# Patient Record
Sex: MALE | Race: WHITE
[De-identification: names, ages, dates, MRNs, and addresses within clinical notes are randomized per-mention and may not be internally consistent; named-entity substitution may affect disease eponyms.]

---

## 2017-07-21 ENCOUNTER — HOSPITAL ENCOUNTER (EMERGENCY)
Dept: HOSPITAL 25 - UCCORT | Age: 47
Discharge: HOME | End: 2017-07-21
Payer: COMMERCIAL

## 2017-07-21 VITALS — SYSTOLIC BLOOD PRESSURE: 136 MMHG | DIASTOLIC BLOOD PRESSURE: 89 MMHG

## 2017-07-21 DIAGNOSIS — S01.01XA: Primary | ICD-10-CM

## 2017-07-21 DIAGNOSIS — Z23: ICD-10-CM
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
## 2017-07-21 PROCEDURE — 99212 OFFICE O/P EST SF 10 MIN: CPT

## 2017-07-21 PROCEDURE — G0463 HOSPITAL OUTPT CLINIC VISIT: HCPCS

## 2017-07-21 PROCEDURE — 12013 RPR F/E/E/N/L/M 2.6-5.0 CM: CPT

## 2017-07-21 PROCEDURE — 90715 TDAP VACCINE 7 YRS/> IM: CPT

## 2017-07-21 PROCEDURE — 90471 IMMUNIZATION ADMIN: CPT

## 2017-07-21 PROCEDURE — 12002 RPR S/N/AX/GEN/TRNK2.6-7.5CM: CPT

## 2017-07-21 NOTE — UC
Laceration HPI





- HPI Summary


HPI Summary: 





47 year old male presents with frontal head laceration. 





- History Of Current Complaint


Chief Complaint: UCLaceration


Stated Complaint: FOREHEAD LAC WC


Time Seen by Provider: 07/21/17 11:07


Pain Intensity: 1


Pain Scale Used: 0-10 Numeric





- Allergies/Home Medications


Allergies/Adverse Reactions: 


 Allergies











Allergy/AdvReac Type Severity Reaction Status Date / Time


 


No Known Allergies Allergy   Verified 07/21/17 11:05














PMH/Surg Hx/FS Hx/Imm Hx





- Surgical History


Surgical History: Yes


Surgery Procedure, Year, and Place: Left index finger with graft, testoscopy 

and release s/p injury at age 5 yrs





- Family History


Known Family History: 


   Negative: Diabetes





- Social History


Alcohol Use: None


Substance Use Type: None


Smoking Status (MU): Never Smoked Tobacco





- Immunization History


Most Recent Tetanus Shot: unknown





Review of Systems


Constitutional: Negative


Skin: Other - fontal scalp laceration


Eyes: Negative


ENT: Negative


Respiratory: Negative


Cardiovascular: Negative


Gastrointestinal: Negative


Genitourinary: Negative


Motor: Negative


Neurovascular: Negative


Musculoskeletal: Negative


Neurological: Negative


Psychological: Negative


All Other Systems Reviewed And Are Negative: Yes





Physical Exam


Triage Information Reviewed: Yes


Vital Signs: 


 Initial Vital Signs











Temp  37.3 C   07/21/17 10:59


 


Pulse  70   07/21/17 10:59


 


Resp  16   07/21/17 10:59


 


BP  136/89   07/21/17 10:59


 


Pulse Ox  98   07/21/17 10:59











Eye Exam: Normal


ENT Exam: Normal


Dental Exam: Normal


Neck exam: Normal


Neck: Positive: 1


Respiratory Exam: Normal


Cardiovascular Exam: Normal


Abdominal Exam: Normal


Musculoskeletal Exam: Normal


Neurological Exam: Normal


Psychological Exam: Normal


Skin Exam: Normal


Skin: Positive: Other - frontal scalp laceration





Laceration Repair





- Laceration Repair


  ** 1


Laceration Size After Repair: Length (cm) - 3


Modified For Repair: No


Cleansing Completed Via Routine Prep: Yes


Irrigation With Pressure Irrigation Device: Yes


Closure Material: Staples - 4 staples to frontal scalp


Suture Of: Skin





Laceration Course/Dx





- Differential Dx - Laceration/Wound


Provider Diagnoses: scalp laceration





Discharge





- Discharge Plan


Condition: Stable


Disposition: HOME


Prescriptions: 


Sulfamethox/Trimethoprim DS* [Bactrim /160 TAB*] 1 tab PO BID #20 tab


Patient Education Materials:  Laceration (ED)


Referrals: 


Niki Doshi MD [Medical Doctor] - 2 Days

## 2017-07-24 ENCOUNTER — HOSPITAL ENCOUNTER (EMERGENCY)
Dept: HOSPITAL 25 - UCCORT | Age: 47
Discharge: HOME | End: 2017-07-24
Payer: COMMERCIAL

## 2017-07-24 VITALS — SYSTOLIC BLOOD PRESSURE: 131 MMHG | DIASTOLIC BLOOD PRESSURE: 86 MMHG

## 2017-07-24 DIAGNOSIS — Z09: Primary | ICD-10-CM

## 2017-07-24 PROCEDURE — 99211 OFF/OP EST MAY X REQ PHY/QHP: CPT

## 2017-07-24 PROCEDURE — G0463 HOSPITAL OUTPT CLINIC VISIT: HCPCS

## 2017-07-24 NOTE — UC
HPI Wound/Suture Re-check





- HPI Summary


HPI Summary: 





4 staples place in top of head 2 days ago--tetanus updated at that time





- History Of Current Complaint


Chief Complaint: [place]


Stated Complaint: RECHECK STAPLES-WC


Time Seen by Provider: 07/24/17 12:20


Hx Obtained From: Patient


Onset/Duration: Sudden Onset, Lasting Days - 2


Surgical Site: 





head


Severity: Mild


Pain Intensity: 0


Pain Scale Used: 0-10 Numeric





- Allergies/Home Medications


Allergies/Adverse Reactions: 


 Allergies











Allergy/AdvReac Type Severity Reaction Status Date / Time


 


No Known Allergies Allergy   Verified 07/24/17 12:26














PMH/Surg Hx/FS Hx/Imm Hx


Previously Healthy: Yes





- Surgical History


Surgical History: Yes


Surgery Procedure, Year, and Place: Left index finger with graft, testoscopy 

and release s/p injury at age 5 yrs





- Family History


Known Family History: 


   Negative: Diabetes





- Social History


Occupation: Employed Full-time


Lives: With Family


Alcohol Use: None


Substance Use Type: None


Smoking Status (MU): Never Smoked Tobacco





- Immunization History


Most Recent Tetanus Shot: unknown





Review of Systems


Constitutional: Negative


Skin: Other - intact staples on top of head---no c/o


Eyes: Negative


ENT: Negative


Respiratory: Negative


Cardiovascular: Negative


Gastrointestinal: Negative


Genitourinary: Negative


Motor: Negative


Neurovascular: Negative


Musculoskeletal: Negative


Neurological: Negative


Psychological: Negative


All Other Systems Reviewed And Are Negative: Yes





Physical Exam


Triage Information Reviewed: Yes


Appearance: Well-Appearing, No Pain Distress, Well-Nourished


Vital Signs Reviewed: Yes


Eye Exam: Normal


Eyes: Positive: Conjunctiva Clear


ENT Exam: Normal


ENT: Positive: Normal ENT inspection, Hearing grossly normal.  Negative: Nasal 

congestion, Nasal drainage, Trismus, Muffled/hoarse voice


Dental Exam: Normal


Neck exam: Normal


Neck: Positive: Supple, Nontender


Respiratory Exam: Normal


Respiratory: Positive: Chest non-tender, No respiratory distress, No accessory 

muscle use


Cardiovascular Exam: Normal


Cardiovascular: Positive: RRR, Pulses Normal, Brisk Capillary Refill


Musculoskeletal Exam: Normal


Musculoskeletal: Positive: Strength Intact, ROM Intact, No Edema


Neurological Exam: Normal


Neurological: Positive: Alert, Muscle Tone Normal


Psychological Exam: Normal


Skin: Positive: Other - 4 intact staples in healing wound no evidence of 

infection





Course/Dx





- Course


Course Of Treatment: continue a/b as rx, return in -9 days for staple removal





- Differential Dx - Laceration/Wound


Differential Diagnoses: Cellulitis, Healing Wound


Provider Diagnoses: Wound re-check, healing wound





Discharge





- Discharge Plan


Condition: Stable


Disposition: HOME


Patient Education Materials:  Staple Care (ED)


Referrals: 


[facility] [place] [facility] [Outside] - If Needed


Additional Instructions: 


return in 8-9 days for staple removal

## 2017-08-01 ENCOUNTER — HOSPITAL ENCOUNTER (EMERGENCY)
Dept: HOSPITAL 25 - UCCORT | Age: 47
Discharge: HOME | End: 2017-08-01
Payer: COMMERCIAL

## 2017-08-01 VITALS — DIASTOLIC BLOOD PRESSURE: 77 MMHG | SYSTOLIC BLOOD PRESSURE: 128 MMHG
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
## 2017-08-01 DIAGNOSIS — S01.01XD: Primary | ICD-10-CM

## 2017-08-01 PROCEDURE — G0463 HOSPITAL OUTPT CLINIC VISIT: HCPCS

## 2017-08-01 PROCEDURE — 99211 OFF/OP EST MAY X REQ PHY/QHP: CPT

## 2017-08-01 NOTE — UC
HPI Wound/Suture Re-check





- HPI Summary


HPI Summary: 





here for staple removal 


4 staple were place on the frontal scalp 9 days ago


the wound is healing well, no pain, swelling or discharge





- History Of Current Complaint


Stated Complaint: STAPLE REMOVAL


Time Seen by Provider: 08/01/17 12:39


Hx Obtained From: Patient


Onset/Duration: Sudden Onset, Lasting Days - 9, Still Present


Severity: Moderate


Procedure Type: staple removal 


Surgery Date: 07/21/17


Head: 


  __________________________














  __________________________





 1 - 1.5 cm laceration 4 staple is inplaced








- Allergies/Home Medications


Allergies/Adverse Reactions: 


 Allergies











Allergy/AdvReac Type Severity Reaction Status Date / Time


 


No Known Allergies Allergy   Verified 07/24/17 12:26














PMH/Surg Hx/FS Hx/Imm Hx


Previously Healthy: Yes





- Surgical History


Surgical History: Yes


Surgery Procedure, Year, and Place: Left index finger with graft, testoscopy 

and release s/p injury at age 5 yrs





- Family History


Known Family History: 


   Negative: Diabetes





- Social History


Alcohol Use: None


Substance Use Type: None


Smoking Status (MU): Never Smoked Tobacco





- Immunization History


Most Recent Tetanus Shot: July 2017





Review of Systems


Constitutional: Negative


Skin: Negative


Eyes: Negative


ENT: Negative


Respiratory: Negative


All Other Systems Reviewed And Are Negative: Yes





Physical Exam


Triage Information Reviewed: Yes


Appearance: Well-Appearing, No Pain Distress, Well-Nourished


Vital Signs: 


 Initial Vital Signs











Temp  99.1 F   08/01/17 12:43


 


Pulse  77   08/01/17 12:43


 


Resp  20   08/01/17 12:43


 


BP  128/77   08/01/17 12:43











Vital Signs Reviewed: Yes


Eye Exam: Normal


Eyes: Positive: Conjunctiva Clear


ENT: Positive: Normal ENT inspection, Hearing grossly normal, Pharynx normal


Neck: Positive: Supple, Nontender, No Lymphadenopathy


Respiratory: Positive: Chest non-tender, Lungs clear, Normal breath sounds


Cardiovascular: Positive: RRR, No Murmur, Pulses Normal


Musculoskeletal Exam: Normal


Skin: Positive: Other - 4 staple was removed from the frontal scalp, wound is 

clean , dry healing well





Course/Dx





- Differential Dx - Laceration/Wound


Provider Diagnoses: staple removal





Discharge





- Discharge Plan


Condition: Stable


Disposition: HOME


Patient Education Materials:  Acute Wound Care (ED)


Referrals: 


No Primary Care Phys,NOPCP [Primary Care Provider] - If Needed

## 2017-09-19 ENCOUNTER — HOSPITAL ENCOUNTER (EMERGENCY)
Dept: HOSPITAL 25 - UCCORT | Age: 47
Discharge: HOME | End: 2017-09-19
Payer: COMMERCIAL

## 2017-09-19 VITALS — SYSTOLIC BLOOD PRESSURE: 137 MMHG | DIASTOLIC BLOOD PRESSURE: 92 MMHG

## 2017-09-19 DIAGNOSIS — S61.512A: Primary | ICD-10-CM
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
## 2017-09-19 PROCEDURE — 12001 RPR S/N/AX/GEN/TRNK 2.5CM/<: CPT

## 2017-09-19 PROCEDURE — G0463 HOSPITAL OUTPT CLINIC VISIT: HCPCS

## 2017-09-19 PROCEDURE — 99211 OFF/OP EST MAY X REQ PHY/QHP: CPT

## 2017-09-19 NOTE — UC
Laceration HPI





- HPI Summary


HPI Summary: 





laceration left wrist x 2 hrs ago 


injury to his wrist at work








- History Of Current Complaint


Chief Complaint: UCLaceration


Stated Complaint: LEFT WRIST LAC (WC)


Time Seen by Provider: 09/19/17 15:53


Hx Obtained From: Patient


Laceration Location: Wrist - left


Mechanism Of Injury: Blunt Trauma


Onset/Duration: Sudden Onset, Lasting Hours - 2, Still Present


Severity: Moderate


Aggravating Factors: Movement





- Allergies/Home Medications


Allergies/Adverse Reactions: 


 Allergies











Allergy/AdvReac Type Severity Reaction Status Date / Time


 


No Known Allergies Allergy   Verified 09/19/17 15:42











Home Medications: 


 Home Medications





Naproxen Sodium [Naproxen Sodium 220 mg] 220 mg PO Q6H PRN 09/19/17 [History 

Confirmed 09/19/17]











PMH/Surg Hx/FS Hx/Imm Hx


Previously Healthy: Yes





- Surgical History


Surgical History: Yes


Surgery Procedure, Year, and Place: Left index finger with graft, testoscopy 

and release s/p injury at age 5 yrs





- Family History


Known Family History: 


   Negative: Diabetes





- Social History


Alcohol Use: None


Substance Use Type: None


Smoking Status (MU): Never Smoked Tobacco





- Immunization History


Most Recent Tetanus Shot: July 2017





Review of Systems


Constitutional: Negative


Skin: Negative


Eyes: Negative


ENT: Negative


Respiratory: Negative


Is Patient Immunocompromised?: No


All Other Systems Reviewed And Are Negative: Yes





Physical Exam


Triage Information Reviewed: Yes


Appearance: Well-Appearing, No Pain Distress, Well-Nourished


Vital Signs: 


 Initial Vital Signs











Temp  99.1 F   09/19/17 15:44


 


Pulse  82   09/19/17 15:44


 


Resp  18   09/19/17 15:44


 


BP  137/92   09/19/17 15:44


 


Pulse Ox  97   09/19/17 15:44











Eye Exam: Normal


Eyes: Positive: Conjunctiva Clear


Neck exam: Normal


Neck: Positive: Supple, Nontender, No Lymphadenopathy


Respiratory: Positive: Chest non-tender, Lungs clear, Normal breath sounds


Cardiovascular Exam: Normal


Cardiovascular: Positive: RRR, No Murmur, Pulses Normal


Abdominal Exam: Normal


Skin: Positive: Other - laceration left wrist : =2 cm / minimal bleeding





Laceration Repair





- Laceration Repair


  ** 1


Description: Linear


Laceration Size After Repair: Length (cm) - 2, Width (mm) - 2, Depth (mm) - 2


Modified For Repair: No


Type Injection: Local


Anesthesia Used: 2.0% Lido


Additive Used (in ml): Epi


Cleansing Completed Via Routine Prep: Yes


Irrigation With Pressure Irrigation Device: Yes


Closure Method: Single Layer


Suture Of: Skin


Suture Type: Nylon - 5.0 nylon x 3





Laceration Course/Dx





- Differential Dx - Laceration/Wound


Provider Diagnoses: laceration left wrist





Discharge





- Discharge Plan


Condition: Stable


Disposition: HOME


Patient Education Materials:  Laceration (ED)


Referrals: 


No Primary Care Phys,NOPCP [Primary Care Provider] - 


Additional Instructions: 


suture removal in 7 days

## 2017-09-25 ENCOUNTER — HOSPITAL ENCOUNTER (EMERGENCY)
Dept: HOSPITAL 25 - UCCORT | Age: 47
Discharge: HOME | End: 2017-09-25
Payer: COMMERCIAL

## 2017-09-25 VITALS — SYSTOLIC BLOOD PRESSURE: 142 MMHG | DIASTOLIC BLOOD PRESSURE: 91 MMHG

## 2017-09-25 VITALS — DIASTOLIC BLOOD PRESSURE: 91 MMHG | SYSTOLIC BLOOD PRESSURE: 142 MMHG
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
## 2017-09-25 DIAGNOSIS — M10.072: Primary | ICD-10-CM

## 2017-09-25 DIAGNOSIS — S51.812D: Primary | ICD-10-CM

## 2017-09-25 PROCEDURE — 99212 OFFICE O/P EST SF 10 MIN: CPT

## 2017-09-25 PROCEDURE — G0463 HOSPITAL OUTPT CLINIC VISIT: HCPCS

## 2017-09-25 NOTE — UC
HPI Wound/Suture Re-check





- HPI Summary


HPI Summary: 





47 YEAR OLD MALE PRESENTS FOR LEFT FOREARM SUTURE REMOVAL.





- History Of Current Complaint


Stated Complaint: SUTURE REMOVAL


Time Seen by Provider: 09/25/17 18:04


Hx Obtained From: Patient


Onset/Duration: Lasting Days


Severity: Mild


Pain Scale Used: 0-10 Numeric - 0





- Allergies/Home Medications


Allergies/Adverse Reactions: 


 Allergies











Allergy/AdvReac Type Severity Reaction Status Date / Time


 


No Known Allergies Allergy   Verified 09/25/17 18:20














PMH/Surg Hx/FS Hx/Imm Hx


Previously Healthy: Yes





- Surgical History


Surgical History: Yes


Surgery Procedure, Year, and Place: Left index finger with graft, testoscopy 

and release s/p injury at age 5 yrs





- Family History


Known Family History: 


   Negative: Diabetes





- Social History


Alcohol Use: None


Substance Use Type: None


Smoking Status (MU): Never Smoked Tobacco





- Immunization History


Most Recent Tetanus Shot: July 2017





Review of Systems


Constitutional: Negative


Skin: Other - LEFT FOREARM LACERATION C/D/I


Eyes: Negative


ENT: Negative


Respiratory: Negative


Cardiovascular: Negative


Gastrointestinal: Negative


Genitourinary: Negative


Motor: Negative


Neurovascular: Negative


Musculoskeletal: Negative


Neurological: Negative


Psychological: Negative


All Other Systems Reviewed And Are Negative: Yes





Physical Exam


Triage Information Reviewed: Yes


Appearance: Well-Appearing


Vital Signs Reviewed: Yes


Eye Exam: Normal


ENT Exam: Normal


Dental Exam: Normal


Neck exam: Normal


Neck: Positive: 1


Respiratory Exam: Normal


Cardiovascular Exam: Normal


Abdominal Exam: Normal


Musculoskeletal Exam: Normal


Neurological Exam: Normal


Psychological Exam: Normal


Skin: Positive: Other - LEFT FOREARM LACERATION C/D/I





Course/Dx





- Differential Dx - Laceration/Wound


Provider Diagnoses: LEFT FOREARM LACERATION REMOVAL





Discharge





- Discharge Plan


Condition: Stable


Disposition: HOME


Patient Education Materials:  Stitches Removal (ED)


Referrals: 


No Primary Care Phys,NOPCP [Primary Care Provider] -

## 2017-09-25 NOTE — UC
Lower Extremity/Ankle HPI





- HPI Summary


HPI Summary: 





47 YEAR OLD MALE PRESENTS WITH COMPLAINS OF LEFT BIG TOE/ANKLE GOUT.





- History of Current Complaint


Stated Complaint: GOUT


Time Seen by Provider: 09/25/17 18:17


Hx Obtained From: Patient


Onset/Duration: Sudden Onset


Severity Initially: Moderate


Severity Currently: Moderate


Pain Scale Used: 0-10 Numeric - 8


Aggravating Factor(s): Standing


Alleviating Factor(s): Rest





- Allergies/Home Medications


Allergies/Adverse Reactions: 


 Allergies











Allergy/AdvReac Type Severity Reaction Status Date / Time


 


No Known Allergies Allergy   Verified 09/25/17 18:20














PMH/Surg Hx/FS Hx/Imm Hx





- Surgical History


Surgical History: Yes


Surgery Procedure, Year, and Place: Left index finger with graft, testoscopy 

and release s/p injury at age 5 yrs





- Family History


Known Family History: 


   Negative: Diabetes





- Social History


Alcohol Use: None


Substance Use Type: None


Smoking Status (MU): Never Smoked Tobacco





- Immunization History


Most Recent Influenza Vaccination: no


Most Recent Tetanus Shot: July 2017





Review of Systems


Constitutional: Negative


Skin: Negative


Eyes: Negative


ENT: Negative


Respiratory: Negative


Cardiovascular: Negative


Gastrointestinal: Negative


Genitourinary: Negative


Motor: Negative


Neurovascular: Negative


Musculoskeletal: Other: - LEFT BIG TOE GOUT


Neurological: Negative


Psychological: Negative


All Other Systems Reviewed And Are Negative: Yes





Physical Exam


Triage Information Reviewed: Yes


Eye Exam: Normal


ENT Exam: Normal


Dental Exam: Normal


Neck exam: Normal


Neck: Positive: 1


Respiratory Exam: Normal


Cardiovascular Exam: Normal


Abdominal Exam: Normal


Musculoskeletal Exam: Normal


Musculoskeletal: Positive: Other: - LEFT BIG TOE/ANKLE PAIN


Neurological Exam: Normal


Psychological Exam: Normal


Skin Exam: Normal





Lower Extremity Course/Dx





- Differential Dx/Diagnosis


Provider Diagnoses: LEFT BIG TOE GOUT/ANKLE





Discharge





- Discharge Plan


Condition: Stable


Disposition: HOME


Prescriptions: 


Colchicine* [Colcrys*] 0.6 mg PO DAILY #6 tab


Indomethacin CAP* [Indocin CAP*] 50 mg PO TID PRN #30 cap


 PRN Reason: Pain


Patient Education Materials:  Gout (ED)


Referrals: 


No Primary Care Phys,NOPCP [Primary Care Provider] -

## 2019-07-25 ENCOUNTER — HOSPITAL ENCOUNTER (EMERGENCY)
Dept: HOSPITAL 25 - UCCORT | Age: 49
Discharge: HOME | End: 2019-07-25
Payer: COMMERCIAL

## 2019-07-25 VITALS — DIASTOLIC BLOOD PRESSURE: 80 MMHG | SYSTOLIC BLOOD PRESSURE: 142 MMHG
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
## 2019-07-25 DIAGNOSIS — S51.812A: Primary | ICD-10-CM

## 2019-07-25 PROCEDURE — 12002 RPR S/N/AX/GEN/TRNK2.6-7.5CM: CPT

## 2019-07-25 PROCEDURE — 99211 OFF/OP EST MAY X REQ PHY/QHP: CPT

## 2019-07-25 PROCEDURE — G0463 HOSPITAL OUTPT CLINIC VISIT: HCPCS

## 2019-07-25 NOTE — UC
Laceration HPI





- HPI Summary


HPI Summary: 





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 

injury. 





- History Of Current Complaint


Chief Complaint: UCLaceration


Stated Complaint: LACERATION ON LEFT ARM-WC


Time Seen by Provider: 07/25/19 16:28


Hx Obtained From: Patient


Laceration Location: Arm - left forearm


Mechanism Of Injury: Sharp Trauma


Onset/Duration: Sudden Onset


Severity: Mild


Pain Intensity: 1


Aggravating Factors: Position, Movement


Related History: Occupational Injury





- Allergies/Home Medications


Allergies/Adverse Reactions: 


 Allergies











Allergy/AdvReac Type Severity Reaction Status Date / Time


 


No Known Allergies Allergy   Verified 07/25/19 16:12











Home Medications: 


 Home Medications





Allopurinol TAB* [Zyloprim 300 MG TAB*] 300 mg PO DAILY 07/25/19 [History 

Confirmed 07/25/19]











PMH/Surg Hx/FS Hx/Imm Hx





- Additional Past Medical History


Additional PMH: 





gout





Previously Healthy: Yes


GI/ History: Kidney Stones





- Surgical History


Surgical History: Yes


Surgery Procedure, Year, and Place: Left index finger with graft, testoscopy 

and release s/p injury at age 5 yrs





- Family History


Known Family History: 


   Negative: Diabetes





- Social History


Alcohol Use: Rare


Substance Use Type: None


Smoking Status (MU): Never Smoked Tobacco





- Immunization History


Most Recent Influenza Vaccination: no


Most Recent Tetanus Shot: July 2017





Review of Systems


All Other Systems Reviewed And Are Negative: Yes


Constitutional: Positive: Negative


Skin: Positive: Other - laceration left forearm, no other injuries.





Physical Exam


Triage Information Reviewed: Yes


Appearance: Well-Appearing, No Pain Distress


Vital Signs: 


 Initial Vital Signs











Temp  98.2 F   07/25/19 16:15


 


Pulse  88   07/25/19 16:15


 


Resp  18   07/25/19 16:15


 


BP  142/80   07/25/19 16:15


 


Pulse Ox  98   07/25/19 16:15











ENT: Positive: Normal ENT inspection


Respiratory: Positive: Lungs clear, Normal breath sounds


Cardiovascular: Positive: RRR, No Murmur


Musculoskeletal Exam: Normal, Other - normal digital and wrist movement.


Neurological: Positive: Alert, Muscle Tone Normal


Psychological Exam: Normal


Skin Exam: Other - left mid volar forearm with 3cm linear laceration.





Laceration Repair





- Laceration Repair


  ** 1


Description: Linear


**: No Repair Necessary


Laceration Size After Repair: Length (cm) - 3


Modified For Repair: No


Type Injection: Local


Anesthesia Used: 2.0% Lido


Cleansing Completed Via Routine Prep: Yes


Irrigation With Pressure Irrigation Device: Yes


Closure Material: Sutures - 3 interrupted sutures 5-0 prolene


Closure Method: Single Layer


Suture Of: Skin


Suture Type: Prolene





Laceration Course/Dx





- Course/Dx


Course Of Treatment: 





repair of laceration left forearm with interrupte sutures. 


Tetanus up to date within 2 years.    





- Differential Dx - Laceration/Wound


Differental Diagnoses: Laceration





- Diagnosis


Provider Diagnosis: 


 Laceration of left forearm without complication








Discharge





- Sign-Out/Discharge


Documenting (check all that apply): Patient Departure - sutures


All imaging exams completed and their final reports reviewed: No Studies





- Discharge Plan


Condition: Stable


Disposition: HOME


Patient Education Materials:  Laceration (ED)


Referrals: 


ALFREDO Garcia [Primary Care Provider] - 


Additional Instructions: 


Keep the wound clean and dry, changing the dressing daily, or if the the 

bandage becomes wet  or soiled. 


Return for removal of sutures in 7 days. 





- Billing Disposition and Condition


Condition: STABLE


Disposition: Home

## 2019-10-18 ENCOUNTER — HOSPITAL ENCOUNTER (EMERGENCY)
Dept: HOSPITAL 25 - UCCORT | Age: 49
Discharge: HOME | End: 2019-10-18
Payer: COMMERCIAL

## 2019-10-18 VITALS — SYSTOLIC BLOOD PRESSURE: 135 MMHG | DIASTOLIC BLOOD PRESSURE: 87 MMHG

## 2019-10-18 DIAGNOSIS — K08.89: Primary | ICD-10-CM

## 2019-10-18 PROCEDURE — G0463 HOSPITAL OUTPT CLINIC VISIT: HCPCS

## 2019-10-18 PROCEDURE — 99212 OFFICE O/P EST SF 10 MIN: CPT

## 2019-10-18 NOTE — UC
Dental HPI





- HPI Summary


HPI Summary: 





Mr. Lagos started with a toothache and swelling today.  He tried to get into 

the dentist but they were closed.





- History of Current Complaint


Chief Complaint: UCDentalProblem


Stated Complaint: SWELLING LEFT SIDE OF GUM


Time Seen by Provider: 10/18/19 17:32


Pain Intensity: 0





- Allergies/Home Medications


Allergies/Adverse Reactions: 


 Allergies











Allergy/AdvReac Type Severity Reaction Status Date / Time


 


No Known Allergies Allergy   Verified 10/18/19 17:27











Home Medications: 


 Home Medications





Ginseng [[place] Ginseng] 250 mg PO QAM 10/18/19 [History Confirmed 10/18/19]


Indomethacin CAP* [Indocin CAP*] 50 mg PO TID PRN 10/18/19 [History Confirmed 10

/18/19]


Mv-Mn/Iron/FA/Vit K/K.ginseng [[employer] Tab] 1 each PO QAM 10/

18/19 [History Confirmed 10/18/19]











PMH/Surg Hx/FS Hx/Imm Hx


Previously Healthy: Yes





- Surgical History


Surgical History: Yes


Surgery Procedure, Year, and Place: Left index finger with graft, testoscopy 

and release s/p injury at age 5 yrs





- Family History


Known Family History: 


   Negative: Diabetes





- Social History


Alcohol Use: None


Substance Use Type: None


Smoking Status (MU): Never Smoked Tobacco





- Immunization History


Most Recent Influenza Vaccination: no


Most Recent Tetanus Shot: 7/21/17





Review of Systems


All Other Systems Reviewed And Are Negative: Yes


Constitutional: Positive: Negative


ENT: Positive: Dental Pain





Physical Exam





- Summary


Physical Exam Summary: 





He is non-toxic in appearance with stable vitals


Triage Information Reviewed: Yes


Appearance: Well-Appearing


Vital Signs: 


 Initial Vital Signs











Temp  98.4 F   10/18/19 17:26


 


Pulse  84   10/18/19 17:26


 


Resp  16   10/18/19 17:26


 


BP  135/87   10/18/19 17:26


 


Pulse Ox  98   10/18/19 17:26











Vital Signs Reviewed: Yes


ENT Exam: Normal


Dental: Positive: Gross Decay/Caries @.  Negative: Abscess @, Cellulitis @





Dental Complaint Course/Dx





- Course


Course Of Treatment: 





He declined pain medication and I will treat with PCN and encourage dentist F/U.





- Differential Dx/Diagnosis


Provider Diagnosis: 


 Tooth ache








Discharge ED





- Sign-Out/Discharge


Documenting (check all that apply): Patient Departure


All imaging exams completed and their final reports reviewed: No Studies





- Discharge Plan


Condition: Stable


Disposition: HOME


Patient Education Materials:  Toothache (ED)


Referrals: 


ALFREDO Garcia [Primary Care Provider] - 





- Billing Disposition and Condition


Condition: STABLE


Disposition: Home

## 2020-03-16 ENCOUNTER — HOSPITAL ENCOUNTER (EMERGENCY)
Dept: HOSPITAL 25 - UCCORT | Age: 50
Discharge: HOME | End: 2020-03-16
Payer: COMMERCIAL

## 2020-03-16 VITALS — DIASTOLIC BLOOD PRESSURE: 84 MMHG | SYSTOLIC BLOOD PRESSURE: 147 MMHG
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
## 2020-03-16 DIAGNOSIS — S39.012A: Primary | ICD-10-CM
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
## 2020-03-16 PROCEDURE — 99212 OFFICE O/P EST SF 10 MIN: CPT

## 2020-03-16 PROCEDURE — G0463 HOSPITAL OUTPT CLINIC VISIT: HCPCS

## 2020-03-16 NOTE — UC
Back Pain HPI





- HPI Summary


HPI Summary: 





49-year-old male who was moving a small box yesterday when he had a muscle 

strain in his right lower back.  He denies any saddle anesthesia, no numbness 

or tingling in his extremities and no problems with bowel or bladder control.





- History of Current Complaint


Chief Complaint: UCBackPain


Stated Complaint: LOW BACK PAIN


Time Seen by Provider: 03/16/20 08:01


Hx Obtained From: Patient


Onset/Duration: Sudden Onset, Lasting Hours


Timing: Intermittent - Pain is more when patient is bending to  

something.


Severity Initially: Moderate


Severity Currently: Mild


Pain Intensity: 0


Character: Dull, Spasmodic - Right lower back.


Aggravating Factor(s): Bending


Alleviating Factor(s): Rest, Heat


Associated Signs And Symptoms: Positive: Negative.  Negative: Bruising, Weakness

, Numbness, Tingling, Abdominal Pain, Flank Pain, Bladder Incontinence, Bowel 

Incontinence, Pain with Weight Bearing





- Allergies/Home Medications


Allergies/Adverse Reactions: 


 Allergies











Allergy/AdvReac Type Severity Reaction Status Date / Time


 


No Known Allergies Allergy   Verified 03/16/20 07:29











Home Medications: 


 Home Medications





Allopurinol TAB* [Zyloprim 300 MG TAB*] 300 mg PO DAILY 07/25/19 [History 

Confirmed 03/16/20]


Indomethacin CAP* [Indocin CAP*] 50 mg PO TID PRN 10/18/19 [History Confirmed 03 /16/20]


Mv-Mn/Iron/FA/Vit K/K.ginseng [[employer] Tab] 1 each PO QAM 10/

18/19 [History Confirmed 03/16/20]


Amoxicillin PO (*) [Amoxicillin 500 MG CAP*] 500 mg PO TID 03/16/20 [History 

Confirmed 03/16/20]


Cyclobenzaprine TAB* [Flexeril 10 MG TAB*] 10 mg PO TID PRN #15 tab 03/16/20 [Rx

]


Ibuprofen TAB* [Motrin TAB* 600 MG] 600 mg PO Q8H PRN #30 tab 03/16/20 [Rx]


oxyCODONE/Acetamin 5/325 MG* [Percocet 5/325 TAB*] 1 tab PO ONCE 03/16/20 [

History Confirmed 03/16/20]











PMH/Surg Hx/FS Hx/Imm Hx


Previously Healthy: Yes





- Surgical History


Surgical History: Yes


Surgery Procedure, Year, and Place: Left index finger with graft, testoscopy 

and release s/p injury at age 5 yrs.  R shoulder lumpectomy





- Family History


Known Family History: 


   Negative: Diabetes





- Social History


Occupation: Employed Full-time


Lives: With Family


Alcohol Use: None


Substance Use Type: None


Smoking Status (MU): Never Smoked Tobacco





- Immunization History


Most Recent Influenza Vaccination: no


Most Recent Tetanus Shot: 7/21/17





Review of Systems


All Other Systems Reviewed And Are Negative: Yes


Musculoskeletal: Positive: Other: - Pain right lower back more with bending.


Is Patient Immunocompromised?: No





Physical Exam


Triage Information Reviewed: Yes


Appearance: Well-Appearing, No Pain Distress, Well-Nourished


Vital Signs: 


 Initial Vital Signs











Temp  98.6 F   03/16/20 07:30


 


Pulse  81   03/16/20 07:30


 


Resp  18   03/16/20 07:30


 


BP  147/84   03/16/20 07:30


 


Pulse Ox  99   03/16/20 07:30











Vital Signs Reviewed: Yes


Respiratory: Positive: Lungs clear, Normal breath sounds, No respiratory 

distress, No accessory muscle use


Cardiovascular: Positive: RRR, No Murmur, Pulses Normal, Brisk Capillary Refill


Abdomen Description: Positive: Nontender, No Organomegaly, Soft.  Negative: CVA 

Tenderness (R), CVA Tenderness (L), Distended, Guarding, Hepatomegaly, McBurney'

s Point Tenderness, Splenomegaly


Bowel Sounds: Positive: Present


Musculoskeletal: Positive: Strength Intact, ROM Intact, Other: - Negative 

straight leg raise, mild pain on palpation right lower back paraspinal muscle 

area.  No bruising, erythema, and deformity or swelling.  Peripheral pulses, 

neuro sensation and capillary refill.  Full range of motion but with mild pain 

right lower back with range of motion.


Neurological Exam: Normal


Psychological Exam: Normal


Skin Exam: Normal





Back Pain Course/Dx





- Course


Course Of Treatment: 





Patient is comfortable here.





- Differential Dx/Diagnosis


Provider Diagnosis: 


 Low back strain








Discharge ED





- Sign-Out/Discharge


Documenting (check all that apply): Patient Departure


All imaging exams completed and their final reports reviewed: No Studies





- Discharge Plan


Condition: Fair


Disposition: HOME


Prescriptions: 


Cyclobenzaprine TAB* [Flexeril 10 MG TAB*] 10 mg PO TID PRN #15 tab


 PRN Reason: Pain - Mild


Ibuprofen TAB* [Motrin TAB* 600 MG] 600 mg PO Q8H PRN #30 tab


 PRN Reason: Pain - Mild


Patient Education Materials:  Low Back Strain (ED)


Forms:  *Work Release


Referrals: 


ALFREDO Garcia [Primary Care Provider] - 


Additional Instructions: 


Heat to the sore area, no drinking alcohol, driving or operating machinery 

while taking the muscle relaxant.  Take the Motrin with food.  Follow-up with 

your primary care provider if no improvement in 2 or 3 days.  Avoid movements 

that cause pain.





- Billing Disposition and Condition


Condition: FAIR


Disposition: Home